# Patient Record
Sex: FEMALE | Race: WHITE | NOT HISPANIC OR LATINO
[De-identification: names, ages, dates, MRNs, and addresses within clinical notes are randomized per-mention and may not be internally consistent; named-entity substitution may affect disease eponyms.]

---

## 2018-10-29 PROBLEM — Z00.00 ENCOUNTER FOR PREVENTIVE HEALTH EXAMINATION: Status: ACTIVE | Noted: 2018-10-29

## 2019-01-24 ENCOUNTER — ASOB RESULT (OUTPATIENT)
Age: 19
End: 2019-01-24

## 2019-01-24 ENCOUNTER — APPOINTMENT (OUTPATIENT)
Dept: ANTEPARTUM | Facility: CLINIC | Age: 19
End: 2019-01-24
Payer: MEDICAID

## 2019-01-24 PROCEDURE — 76817 TRANSVAGINAL US OBSTETRIC: CPT

## 2019-01-24 PROCEDURE — 76811 OB US DETAILED SNGL FETUS: CPT

## 2019-06-12 ENCOUNTER — OUTPATIENT (OUTPATIENT)
Dept: OUTPATIENT SERVICES | Facility: HOSPITAL | Age: 19
LOS: 1 days | Discharge: HOME | End: 2019-06-12
Payer: MEDICAID

## 2019-06-12 VITALS
SYSTOLIC BLOOD PRESSURE: 129 MMHG | HEART RATE: 117 BPM | DIASTOLIC BLOOD PRESSURE: 79 MMHG | TEMPERATURE: 100 F | RESPIRATION RATE: 19 BRPM

## 2019-06-12 VITALS — SYSTOLIC BLOOD PRESSURE: 129 MMHG | HEART RATE: 117 BPM | DIASTOLIC BLOOD PRESSURE: 79 MMHG

## 2019-06-12 PROCEDURE — 76815 OB US LIMITED FETUS(S): CPT | Mod: 26

## 2019-06-12 PROCEDURE — 59025 FETAL NON-STRESS TEST: CPT | Mod: 26,59

## 2019-06-12 PROCEDURE — 99213 OFFICE O/P EST LOW 20 MIN: CPT | Mod: 25

## 2019-06-12 NOTE — OB PROVIDER TRIAGE NOTE - NSHPLABSRESULTS_GEN_ALL_CORE
CRISTIANE Easton  [facility] [address] [place] [address]  [phone]               Thank you for choosing us for your health care visit with Nicci Valdovinos MD.  We are glad to serve you and happy to provide you with this summary of your visit.   [place], [address], [address] [facility]  [address]  [address] [place] [address]    Serena Tanner [address]  [address] [place], [place]    It is the patient's responsibility to check Call the zhiwok for assistance with your inactive [employer] account    If you have questions, you can call [phone] to talk to our [facility] Staff. Remember, [employer] is NOT to be used for urgent needs. For medical emergencies, dial 911.     Vi walking, light jogging, cycling, swimming, etc.) for a goal of at least 150 minutes per week. Moderation of alcohol consumption Men: limit to <= 2 drinks* per day. Women and lighter weight persons: limit to <= 1 drink* per day. GBS negative   B positive  HBSAG non reactive   RUbella immune  MMR immune  Mumps immune  RPR non reactive

## 2019-06-12 NOTE — OB PROVIDER TRIAGE NOTE - NSHPPHYSICALEXAM_GEN_ALL_CORE
PHYSICAL EXAM:  T(F): 99.8 (06-12 @ 08:16)  HR: 117 (06-12 @ 08:16)  BP: 129/79 (06-12 @ 08:16)  RR: 19 (06-12 @ 08:16)  Constitutional: AAOx3, NAD  Abdomen: Soft, gravid, nontender, no palpable ctx  '  FHR CAT I   TOCO occasional  vE 2/60/-3 vertex intact membranes by DR Andrade

## 2019-06-12 NOTE — OB PROVIDER TRIAGE NOTE - NSOBPROVIDERNOTE_OBGYN_ALL_OB_FT
40.1 week IUP intact membranes not in labor  d/c to home as per Dr Andrade  FK & labor precautions  f/u pmd as directed

## 2019-06-12 NOTE — OB RN TRIAGE NOTE - NS_FINALEDD_OBGYN_ALL_OB_DT
PRINCIPAL DISCHARGE DIAGNOSIS  Diagnosis: SOB (shortness of breath)  Assessment and Plan of Treatment: Continue current medications   Follow up with doctor in 1 week.      SECONDARY DISCHARGE DIAGNOSES  Diagnosis: Viral upper respiratory illness  Assessment and Plan of Treatment: Continue current medications   Follow up with doctor in 1 week.    Diagnosis: ESRD on dialysis  Assessment and Plan of Treatment: Continue with your dialysis treatments. Follow with your nephrologist (kidney physician). Continue your medications as prescribed. 11-Jun-2019

## 2019-06-12 NOTE — OB PROVIDER TRIAGE NOTE - HISTORY OF PRESENT ILLNESS
19 y/o  40.1 weeks edmund 19 dated by 1st trimester sonogram who presents to l7 D for possible labor. Pt reports + fm no srom no vaginal bleeding, + movement

## 2019-06-17 ENCOUNTER — OUTPATIENT (OUTPATIENT)
Dept: OUTPATIENT SERVICES | Facility: HOSPITAL | Age: 19
LOS: 1 days | Discharge: HOME | End: 2019-06-17

## 2019-06-17 ENCOUNTER — INPATIENT (INPATIENT)
Facility: HOSPITAL | Age: 19
LOS: 2 days | Discharge: HOME | End: 2019-06-20
Attending: OBSTETRICS & GYNECOLOGY | Admitting: OBSTETRICS & GYNECOLOGY
Payer: MEDICAID

## 2019-06-17 VITALS — TEMPERATURE: 99 F | SYSTOLIC BLOOD PRESSURE: 129 MMHG | DIASTOLIC BLOOD PRESSURE: 73 MMHG

## 2019-06-17 VITALS — TEMPERATURE: 99 F

## 2019-06-17 LAB
APPEARANCE UR: CLEAR — SIGNIFICANT CHANGE UP
BASOPHILS # BLD AUTO: 0.03 K/UL — SIGNIFICANT CHANGE UP (ref 0–0.2)
BASOPHILS NFR BLD AUTO: 0.3 % — SIGNIFICANT CHANGE UP (ref 0–1)
BILIRUB UR-MCNC: NEGATIVE — SIGNIFICANT CHANGE UP
COLOR SPEC: YELLOW — SIGNIFICANT CHANGE UP
DIFF PNL FLD: NEGATIVE — SIGNIFICANT CHANGE UP
EOSINOPHIL # BLD AUTO: 0.06 K/UL — SIGNIFICANT CHANGE UP (ref 0–0.7)
EOSINOPHIL NFR BLD AUTO: 0.6 % — SIGNIFICANT CHANGE UP (ref 0–8)
GLUCOSE UR QL: NEGATIVE MG/DL — SIGNIFICANT CHANGE UP
HCT VFR BLD CALC: 35.9 % — LOW (ref 37–47)
HGB BLD-MCNC: 11.8 G/DL — LOW (ref 12–16)
IMM GRANULOCYTES NFR BLD AUTO: 0.5 % — HIGH (ref 0.1–0.3)
KETONES UR-MCNC: NEGATIVE — SIGNIFICANT CHANGE UP
LEUKOCYTE ESTERASE UR-ACNC: NEGATIVE — SIGNIFICANT CHANGE UP
LYMPHOCYTES # BLD AUTO: 2.12 K/UL — SIGNIFICANT CHANGE UP (ref 1.2–3.4)
LYMPHOCYTES # BLD AUTO: 20.3 % — LOW (ref 20.5–51.1)
MCHC RBC-ENTMCNC: 27.6 PG — SIGNIFICANT CHANGE UP (ref 27–31)
MCHC RBC-ENTMCNC: 32.9 G/DL — SIGNIFICANT CHANGE UP (ref 32–37)
MCV RBC AUTO: 83.9 FL — SIGNIFICANT CHANGE UP (ref 81–99)
MONOCYTES # BLD AUTO: 0.97 K/UL — HIGH (ref 0.1–0.6)
MONOCYTES NFR BLD AUTO: 9.3 % — SIGNIFICANT CHANGE UP (ref 1.7–9.3)
NEUTROPHILS # BLD AUTO: 7.19 K/UL — HIGH (ref 1.4–6.5)
NEUTROPHILS NFR BLD AUTO: 69 % — SIGNIFICANT CHANGE UP (ref 42.2–75.2)
NITRITE UR-MCNC: NEGATIVE — SIGNIFICANT CHANGE UP
NRBC # BLD: 0 /100 WBCS — SIGNIFICANT CHANGE UP (ref 0–0)
PH UR: 7.5 — SIGNIFICANT CHANGE UP (ref 5–8)
PLATELET # BLD AUTO: 246 K/UL — SIGNIFICANT CHANGE UP (ref 130–400)
PRENATAL SYPHILIS TEST: SIGNIFICANT CHANGE UP
PROT UR-MCNC: NEGATIVE MG/DL — SIGNIFICANT CHANGE UP
RBC # BLD: 4.28 M/UL — SIGNIFICANT CHANGE UP (ref 4.2–5.4)
RBC # FLD: 14.3 % — SIGNIFICANT CHANGE UP (ref 11.5–14.5)
SP GR SPEC: 1.01 — SIGNIFICANT CHANGE UP (ref 1.01–1.03)
UROBILINOGEN FLD QL: 0.2 MG/DL — SIGNIFICANT CHANGE UP (ref 0.2–0.2)
WBC # BLD: 10.42 K/UL — SIGNIFICANT CHANGE UP (ref 4.8–10.8)
WBC # FLD AUTO: 10.42 K/UL — SIGNIFICANT CHANGE UP (ref 4.8–10.8)

## 2019-06-17 PROCEDURE — 76815 OB US LIMITED FETUS(S): CPT | Mod: 26

## 2019-06-17 PROCEDURE — 59025 FETAL NON-STRESS TEST: CPT | Mod: 26,59

## 2019-06-17 PROCEDURE — 99213 OFFICE O/P EST LOW 20 MIN: CPT | Mod: 25

## 2019-06-17 RX ORDER — CITRIC ACID/SODIUM CITRATE 300-500 MG
15 SOLUTION, ORAL ORAL EVERY 6 HOURS
Refills: 0 | Status: DISCONTINUED | OUTPATIENT
Start: 2019-06-17 | End: 2019-06-18

## 2019-06-17 RX ORDER — OXYTOCIN 10 UNIT/ML
333.33 VIAL (ML) INJECTION
Qty: 20 | Refills: 0 | Status: DISCONTINUED | OUTPATIENT
Start: 2019-06-17 | End: 2019-06-20

## 2019-06-17 RX ORDER — SODIUM CHLORIDE 9 MG/ML
1000 INJECTION, SOLUTION INTRAVENOUS
Refills: 0 | Status: DISCONTINUED | OUTPATIENT
Start: 2019-06-17 | End: 2019-06-18

## 2019-06-17 RX ORDER — SODIUM CHLORIDE 9 MG/ML
1000 INJECTION, SOLUTION INTRAVENOUS
Refills: 0 | Status: DISCONTINUED | OUTPATIENT
Start: 2019-06-17 | End: 2019-06-17

## 2019-06-17 RX ORDER — ONDANSETRON 8 MG/1
4 TABLET, FILM COATED ORAL EVERY 6 HOURS
Refills: 0 | Status: DISCONTINUED | OUTPATIENT
Start: 2019-06-17 | End: 2019-06-20

## 2019-06-17 RX ORDER — OXYTOCIN 10 UNIT/ML
333.33 VIAL (ML) INJECTION
Qty: 20 | Refills: 0 | Status: DISCONTINUED | OUTPATIENT
Start: 2019-06-17 | End: 2019-06-17

## 2019-06-17 RX ORDER — NALOXONE HYDROCHLORIDE 4 MG/.1ML
0.1 SPRAY NASAL
Refills: 0 | Status: DISCONTINUED | OUTPATIENT
Start: 2019-06-17 | End: 2019-06-20

## 2019-06-17 RX ORDER — OXYTOCIN 10 UNIT/ML
2 VIAL (ML) INJECTION
Qty: 30 | Refills: 0 | Status: DISCONTINUED | OUTPATIENT
Start: 2019-06-17 | End: 2019-06-18

## 2019-06-17 RX ORDER — FENTANYL/BUPIVACAINE/NS/PF 2MCG/ML-.1
250 PLASTIC BAG, INJECTION (ML) INJECTION
Refills: 0 | Status: DISCONTINUED | OUTPATIENT
Start: 2019-06-17 | End: 2019-06-20

## 2019-06-17 RX ADMIN — Medication 2 MILLIUNIT(S)/MIN: at 22:15

## 2019-06-17 NOTE — OB RN TRIAGE NOTE - CHIEF COMPLAINT QUOTE
Contractions every 5 minutes since 3am Labor check/ postdate, contractions every 5 minutes since 3am

## 2019-06-17 NOTE — OB RN PATIENT PROFILE - NSCAFFEINETYPE_GEN_ALL_CORE_SD
· Orthotics were fit into patient's shoes after being trimmed.   · Patient trial of orthotics with walking, standing.  Patient reports orthotics feel comfortable.  · Patient was instructed in a donning regimen to allow a comfortable transition to full-time use of orthotics.  · Patient was instructed on care of their orthotics including weaning, care, cleaning, fitting, and skin care.  · Orthotics fit for tennis shoes.     ASSESSMENT   Patient does  demonstrate awareness of appropriate weaning process into their new orthotics as well as monitoring skin care.  Understands patient is to call with questions/concerns regarding fitting.    Patient has been instructed to contact our clinic with any complications arising from their use or lack of improvement in symptoms.  Patient Verbalizes understanding of education as documented/outlined above.     [facility] labs cast number [phone]  
coffee

## 2019-06-17 NOTE — OB PROVIDER TRIAGE NOTE - NSOBPROVIDERNOTE_OBGYN_ALL_OB_FT
18y.o.  @ 40.6wks in ealry labor, GBS negative, Measles immune  - Pt to ambulate x 2 hrs and return to L&D fo re-evaluation  - Labor precautions reviewed  - D/W Dr. dodge

## 2019-06-17 NOTE — PROCEDURE NOTE - ADDITIONAL PROCEDURE DETAILS
Post Epidural, pt c/o of "dizziness".  Administered:  Phenylephrine 50/50 = 100 mcg IVP + Ephedrine 5mg IVP x 1

## 2019-06-17 NOTE — OB PROVIDER TRIAGE NOTE - NSHPPHYSICALEXAM_GEN_ALL_CORE
T(C): 37.4 (06-17-19 @ 05:49), Max: 37.4 (06-17-19 @ 05:49)  HR: 102 (06-17-19 @ 05:54) (102 - 102)  BP: 114/77 (06-17-19 @ 05:54) (114/77 - 114/77)  RR: 16 (06-17-19 @ 05:49) (16 - 16)    VE: 3/90/-1  Ctx: q 5-7 min  FHR: 130 moderate variability, +accels, Cat I

## 2019-06-17 NOTE — PROGRESS NOTE ADULT - ASSESSMENT
18y  at 40w6d, GBS negative, undergoing induction with pitocin, s/p AROM, clear at 2240, s/p epidural      -Cont efm and toco  -cont with pitocin  -IV hydration and clear liquid diet  -Pain management PRN    Dr. Ness and Dr. Hdz aware

## 2019-06-17 NOTE — OB PROVIDER TRIAGE NOTE - ADDITIONAL INSTRUCTIONS
If you feel regular, painful contraction, Leaking of fluid, heavy vaginal bleeding or decreased fetal movement call your doctor or return to hospital  Increase your fluid intake  Followup tomorrow with your doctor

## 2019-06-17 NOTE — OB PROVIDER TRIAGE NOTE - PLAN OF CARE
Healthy mom/ Healthy baby Discharge to home  PO hydration  Labor precuations/ FKC reviewed  Followup with PMD tomorrow  Dr. Caputo/ Dr. Hdz aware

## 2019-06-17 NOTE — OB PROVIDER TRIAGE NOTE - NSHPLABSRESULTS_GEN_ALL_CORE
10/17/2018    Type and screen B positive, Ab negative  RPR nonreactive  HBSag nonreactive  HIV  Rubella immune  Varicella immune  measles immune    Gonorrhea negative  Chlamydia negative        3rd trimester- 5/13/2019  RPR nonreactive  GBS 5/13/2019 negative  HIV nonreactive    Sono @ 20wks S=D, ant placenta, nl anatomy

## 2019-06-17 NOTE — OB PROVIDER TRIAGE NOTE - HISTORY OF PRESENT ILLNESS
18y.o.  @ 40.6wks EDC: 2019 by 1st trimester sono presents c/o ctx since 0300am, q 5  min. Pt denies LOF, VB and states good FM Pt was examined in office 3 days ago and was 2cm.

## 2019-06-17 NOTE — OB RN TRIAGE NOTE - NS_TRIAGEADDITIONAL COMMENTS_OBGYN_ALL_OB_FT
patient sent walking as per ANTONIO Landin, instructed on when to return to L&D, verbalized understanding

## 2019-06-17 NOTE — OB PROVIDER H&P - ASSESSMENT
A/P: 18y.o.  @ 40.6wks r/o labor, BPP , for admission and IOL  -admit to labor and delivery  -pain management prn  -cont efm/toco  -admission labs  -iv hydration  -clear liquid diet  -pitocin for IOL

## 2019-06-17 NOTE — OB PROVIDER H&P - NSHPPHYSICALEXAM_GEN_ALL_CORE
T(C): 37.4 (06-17-19 @ 05:49), Max: 37.4 (06-17-19 @ 05:49)  HR: 102 (06-17-19 @ 05:54) (102 - 102)  BP: 114/77 (06-17-19 @ 05:54) (114/77 - 114/77)  RR: 16 (06-17-19 @ 05:49) (16 - 16)    VE: 5/90/0 @1830  Ctx: q 5-7 min  FHR: 130 moderate variability, +accels, Cat I

## 2019-06-17 NOTE — PROCEDURE NOTE - SUPERVISORY STATEMENT
Epidural infusion:  0.1% Bupivacaine with Fentanyl 2 mcg/ml (premixed)  Rate:  15 ml/ml  Sensory:  T8 (Rt = Mid = Lt)  Motor intact, able to flex b/l knees w/o any difficulty

## 2019-06-18 DIAGNOSIS — Z02.9 ENCOUNTER FOR ADMINISTRATIVE EXAMINATIONS, UNSPECIFIED: ICD-10-CM

## 2019-06-18 LAB
AMPHET UR-MCNC: NEGATIVE — SIGNIFICANT CHANGE UP
ANISOCYTOSIS BLD QL: SLIGHT — SIGNIFICANT CHANGE UP
BARBITURATES UR SCN-MCNC: NEGATIVE — SIGNIFICANT CHANGE UP
BASOPHILS # BLD AUTO: 0 K/UL — SIGNIFICANT CHANGE UP (ref 0–0.2)
BASOPHILS NFR BLD AUTO: 0 % — SIGNIFICANT CHANGE UP (ref 0–1)
BENZODIAZ UR-MCNC: NEGATIVE — SIGNIFICANT CHANGE UP
BLD GP AB SCN SERPL QL: SIGNIFICANT CHANGE UP
BUPRENORPHINE SCREEN, URINE RESULT: NEGATIVE — SIGNIFICANT CHANGE UP
COCAINE METAB.OTHER UR-MCNC: NEGATIVE — SIGNIFICANT CHANGE UP
EOSINOPHIL # BLD AUTO: 0.25 K/UL — SIGNIFICANT CHANGE UP (ref 0–0.7)
EOSINOPHIL NFR BLD AUTO: 1.8 % — SIGNIFICANT CHANGE UP (ref 0–8)
GIANT PLATELETS BLD QL SMEAR: PRESENT — SIGNIFICANT CHANGE UP
HCT VFR BLD CALC: 29.6 % — LOW (ref 37–47)
HGB BLD-MCNC: 9.6 G/DL — LOW (ref 12–16)
HYPOCHROMIA BLD QL: SLIGHT — SIGNIFICANT CHANGE UP
L&D DRUG SCREEN, URINE: SIGNIFICANT CHANGE UP
LYMPHOCYTES # BLD AUTO: 17.3 % — LOW (ref 20.5–51.1)
LYMPHOCYTES # BLD AUTO: 2.44 K/UL — SIGNIFICANT CHANGE UP (ref 1.2–3.4)
MANUAL SMEAR VERIFICATION: SIGNIFICANT CHANGE UP
MCHC RBC-ENTMCNC: 27.7 PG — SIGNIFICANT CHANGE UP (ref 27–31)
MCHC RBC-ENTMCNC: 32.4 G/DL — SIGNIFICANT CHANGE UP (ref 32–37)
MCV RBC AUTO: 85.5 FL — SIGNIFICANT CHANGE UP (ref 81–99)
METHADONE UR-MCNC: NEGATIVE — SIGNIFICANT CHANGE UP
MICROCYTES BLD QL: SLIGHT — SIGNIFICANT CHANGE UP
MONOCYTES # BLD AUTO: 1.28 K/UL — HIGH (ref 0.1–0.6)
MONOCYTES NFR BLD AUTO: 9.1 % — SIGNIFICANT CHANGE UP (ref 1.7–9.3)
MYELOCYTES NFR BLD: 0.9 % — HIGH (ref 0–0)
NEUTROPHILS # BLD AUTO: 10.01 K/UL — HIGH (ref 1.4–6.5)
NEUTROPHILS NFR BLD AUTO: 70.9 % — SIGNIFICANT CHANGE UP (ref 42.2–75.2)
NEUTS HYPERSEG # BLD: PRESENT — SIGNIFICANT CHANGE UP
OPIATES UR-MCNC: NEGATIVE — SIGNIFICANT CHANGE UP
OXYCODONE UR-MCNC: NEGATIVE — SIGNIFICANT CHANGE UP
PCP UR-MCNC: NEGATIVE — SIGNIFICANT CHANGE UP
PLAT MORPH BLD: NORMAL — SIGNIFICANT CHANGE UP
PLATELET # BLD AUTO: 206 K/UL — SIGNIFICANT CHANGE UP (ref 130–400)
POIKILOCYTOSIS BLD QL AUTO: SLIGHT — SIGNIFICANT CHANGE UP
PROPOXYPHENE QUALITATIVE URINE RESULT: NEGATIVE — SIGNIFICANT CHANGE UP
RBC # BLD: 3.46 M/UL — LOW (ref 4.2–5.4)
RBC # FLD: 14.7 % — HIGH (ref 11.5–14.5)
RBC BLD AUTO: ABNORMAL
WBC # BLD: 14.12 K/UL — HIGH (ref 4.8–10.8)
WBC # FLD AUTO: 14.12 K/UL — HIGH (ref 4.8–10.8)

## 2019-06-18 PROCEDURE — 59400 OBSTETRICAL CARE: CPT | Mod: U9

## 2019-06-18 RX ORDER — SIMETHICONE 80 MG/1
80 TABLET, CHEWABLE ORAL EVERY 4 HOURS
Refills: 0 | Status: DISCONTINUED | OUTPATIENT
Start: 2019-06-18 | End: 2019-06-20

## 2019-06-18 RX ORDER — IBUPROFEN 200 MG
600 TABLET ORAL EVERY 6 HOURS
Refills: 0 | Status: DISCONTINUED | OUTPATIENT
Start: 2019-06-18 | End: 2019-06-20

## 2019-06-18 RX ORDER — IBUPROFEN 200 MG
600 TABLET ORAL EVERY 6 HOURS
Refills: 0 | Status: COMPLETED | OUTPATIENT
Start: 2019-06-18 | End: 2020-05-16

## 2019-06-18 RX ORDER — KETOROLAC TROMETHAMINE 30 MG/ML
30 SYRINGE (ML) INJECTION ONCE
Refills: 0 | Status: DISCONTINUED | OUTPATIENT
Start: 2019-06-18 | End: 2019-06-18

## 2019-06-18 RX ORDER — MAGNESIUM HYDROXIDE 400 MG/1
30 TABLET, CHEWABLE ORAL
Refills: 0 | Status: DISCONTINUED | OUTPATIENT
Start: 2019-06-18 | End: 2019-06-20

## 2019-06-18 RX ORDER — OXYCODONE HYDROCHLORIDE 5 MG/1
5 TABLET ORAL
Refills: 0 | Status: DISCONTINUED | OUTPATIENT
Start: 2019-06-18 | End: 2019-06-20

## 2019-06-18 RX ORDER — ACETAMINOPHEN 500 MG
975 TABLET ORAL EVERY 6 HOURS
Refills: 0 | Status: DISCONTINUED | OUTPATIENT
Start: 2019-06-18 | End: 2019-06-20

## 2019-06-18 RX ORDER — GLYCERIN ADULT
1 SUPPOSITORY, RECTAL RECTAL AT BEDTIME
Refills: 0 | Status: DISCONTINUED | OUTPATIENT
Start: 2019-06-18 | End: 2019-06-20

## 2019-06-18 RX ORDER — DIPHENHYDRAMINE HCL 50 MG
25 CAPSULE ORAL EVERY 6 HOURS
Refills: 0 | Status: DISCONTINUED | OUTPATIENT
Start: 2019-06-18 | End: 2019-06-20

## 2019-06-18 RX ORDER — DOCUSATE SODIUM 100 MG
100 CAPSULE ORAL
Refills: 0 | Status: DISCONTINUED | OUTPATIENT
Start: 2019-06-18 | End: 2019-06-20

## 2019-06-18 RX ORDER — AER TRAVELER 0.5 G/1
1 SOLUTION RECTAL; TOPICAL EVERY 4 HOURS
Refills: 0 | Status: DISCONTINUED | OUTPATIENT
Start: 2019-06-18 | End: 2019-06-20

## 2019-06-18 RX ORDER — SODIUM CHLORIDE 9 MG/ML
3 INJECTION INTRAMUSCULAR; INTRAVENOUS; SUBCUTANEOUS EVERY 8 HOURS
Refills: 0 | Status: DISCONTINUED | OUTPATIENT
Start: 2019-06-18 | End: 2019-06-19

## 2019-06-18 RX ORDER — BENZOCAINE 10 %
1 GEL (GRAM) MUCOUS MEMBRANE EVERY 6 HOURS
Refills: 0 | Status: DISCONTINUED | OUTPATIENT
Start: 2019-06-18 | End: 2019-06-20

## 2019-06-18 RX ORDER — PRAMOXINE HYDROCHLORIDE 150 MG/15G
1 AEROSOL, FOAM RECTAL EVERY 4 HOURS
Refills: 0 | Status: DISCONTINUED | OUTPATIENT
Start: 2019-06-18 | End: 2019-06-20

## 2019-06-18 RX ORDER — HYDROCORTISONE 1 %
1 OINTMENT (GRAM) TOPICAL EVERY 6 HOURS
Refills: 0 | Status: DISCONTINUED | OUTPATIENT
Start: 2019-06-18 | End: 2019-06-20

## 2019-06-18 RX ORDER — DIBUCAINE 1 %
1 OINTMENT (GRAM) RECTAL EVERY 6 HOURS
Refills: 0 | Status: DISCONTINUED | OUTPATIENT
Start: 2019-06-18 | End: 2019-06-20

## 2019-06-18 RX ORDER — LANOLIN
1 OINTMENT (GRAM) TOPICAL EVERY 6 HOURS
Refills: 0 | Status: DISCONTINUED | OUTPATIENT
Start: 2019-06-18 | End: 2019-06-20

## 2019-06-18 RX ORDER — OXYCODONE HYDROCHLORIDE 5 MG/1
5 TABLET ORAL ONCE
Refills: 0 | Status: DISCONTINUED | OUTPATIENT
Start: 2019-06-18 | End: 2019-06-20

## 2019-06-18 RX ADMIN — Medication 600 MILLIGRAM(S): at 12:24

## 2019-06-18 RX ADMIN — Medication 30 MILLIGRAM(S): at 07:00

## 2019-06-18 RX ADMIN — Medication 600 MILLIGRAM(S): at 17:09

## 2019-06-18 RX ADMIN — Medication 975 MILLIGRAM(S): at 20:57

## 2019-06-18 NOTE — OB PROVIDER DELIVERY SUMMARY - NSPROVIDERDELIVERYNOTE_OBGYN_ALL_OB_FT
Patient was fully dilated and pushing. Fetal head was OA and restituted to LOT. The anterior and posterior shoulders delivered, followed by the remaining body atraumatically. Delayed cord clamping was performed, and then clamped and cut. Cord blood gases collected x2. The  was handed to the mother. The placenta delivered intact with membranes. Pitocin was administered. Uterus massaged, fundus found to be firm. Cervix, vagina and perineum inspected. Second laceration was noted, repaired using 2-0 chromic in the usual fashion with good hemostasis.     Viable female infant delivered, with APGARs 9/9    Laceration: 2nd degree laceration   EBL 300cc

## 2019-06-18 NOTE — PROGRESS NOTE ADULT - ASSESSMENT
18y  at 40w6d, GBS negative, undergoing induction with pitocin, s/p AROM, clear at 2240, s/p epidural      -Cont efm and toco  -cont with pitocin  -IV hydration and clear liquid diet  -Pain management PRN    Dr. Nses and Dr. Hdz aware

## 2019-06-19 RX ADMIN — Medication 600 MILLIGRAM(S): at 06:25

## 2019-06-19 RX ADMIN — Medication 975 MILLIGRAM(S): at 15:50

## 2019-06-19 RX ADMIN — SODIUM CHLORIDE 3 MILLILITER(S): 9 INJECTION INTRAMUSCULAR; INTRAVENOUS; SUBCUTANEOUS at 06:20

## 2019-06-19 RX ADMIN — Medication 600 MILLIGRAM(S): at 23:09

## 2019-06-19 RX ADMIN — Medication 600 MILLIGRAM(S): at 12:22

## 2019-06-19 RX ADMIN — Medication 600 MILLIGRAM(S): at 18:30

## 2019-06-19 RX ADMIN — Medication 975 MILLIGRAM(S): at 20:51

## 2019-06-19 RX ADMIN — Medication 975 MILLIGRAM(S): at 21:18

## 2019-06-19 RX ADMIN — Medication 975 MILLIGRAM(S): at 09:30

## 2019-06-20 ENCOUNTER — TRANSCRIPTION ENCOUNTER (OUTPATIENT)
Age: 19
End: 2019-06-20

## 2019-06-20 VITALS
TEMPERATURE: 98 F | DIASTOLIC BLOOD PRESSURE: 66 MMHG | SYSTOLIC BLOOD PRESSURE: 122 MMHG | HEART RATE: 100 BPM | RESPIRATION RATE: 18 BRPM

## 2019-06-20 PROCEDURE — 93970 EXTREMITY STUDY: CPT | Mod: 26

## 2019-06-20 RX ORDER — ACETAMINOPHEN 500 MG
3 TABLET ORAL
Qty: 0 | Refills: 0 | DISCHARGE
Start: 2019-06-20

## 2019-06-20 RX ORDER — DIBUCAINE 1 %
1 OINTMENT (GRAM) RECTAL
Qty: 0 | Refills: 0 | DISCHARGE
Start: 2019-06-20

## 2019-06-20 RX ORDER — LANOLIN
1 OINTMENT (GRAM) TOPICAL
Qty: 0 | Refills: 0 | DISCHARGE
Start: 2019-06-20

## 2019-06-20 RX ORDER — HYDROCORTISONE 1 %
1 OINTMENT (GRAM) TOPICAL
Qty: 0 | Refills: 0 | DISCHARGE
Start: 2019-06-20

## 2019-06-20 RX ORDER — AER TRAVELER 0.5 G/1
1 SOLUTION RECTAL; TOPICAL
Qty: 0 | Refills: 0 | DISCHARGE
Start: 2019-06-20

## 2019-06-20 RX ORDER — BENZOCAINE 10 %
1 GEL (GRAM) MUCOUS MEMBRANE
Qty: 0 | Refills: 0 | DISCHARGE
Start: 2019-06-20

## 2019-06-20 RX ADMIN — Medication 600 MILLIGRAM(S): at 05:59

## 2019-06-20 RX ADMIN — Medication 975 MILLIGRAM(S): at 09:41

## 2019-06-20 RX ADMIN — Medication 975 MILLIGRAM(S): at 10:10

## 2019-06-20 NOTE — DISCHARGE NOTE OB - PLAN OF CARE
healthy mom & baby A/P:  18y Female s/p  PPD2, doing well, no issues.  -Encourage PO hydration and ambulation  -Pain management PRN  -d/c to home

## 2019-06-20 NOTE — DISCHARGE NOTE OB - PATIENT PORTAL LINK FT
You can access the [facility] Patient Portal, offered by [facility], by registering with the following website: [email or web address]

## 2019-06-20 NOTE — DISCHARGE NOTE OB - HOSPITAL COURSE
Patient admitted in labor, underwent uncomplicated vaginal delivery and had an uncomplicated postpartum course, discharged on PPD 2.

## 2019-06-20 NOTE — PROGRESS NOTE ADULT - SUBJECTIVE AND OBJECTIVE BOX
OB attending  PPD #2    Pt doing well, pain well controlled. Patient c/o pain in R calf     Ambulating: Yes  Voiding: Yes  Flatus: Yes  Bowel movements: Yes   Breast or bottle feeding: Breastfeeding  Diet: Regular    PAST MEDICAL & SURGICAL HISTORY:  No pertinent past medical history  No significant past surgical history      Physical Exam  Vital Signs Last 24 Hrs  T(C): 36.6 (2019 07:38), Max: 36.6 (2019 07:38)  T(F): 97.9 (2019 07:38), Max: 97.9 (2019 07:38)  HR: 100 (2019 07:38) (72 - 100)  BP: 122/66 (2019 07:38) (100/58 - 132/62)  BP(mean): --  RR: 18 (2019 07:38) (18 - 18)  SpO2: --  Gen: AAOx3, NAD  Abd: Soft, nontender, nondistended, BS+  Fundus: Firm, below umbilicus  Lochia: normal  Ext: mild tenderness Rcalf, no swelling, No homans , Left is normal     Labs:                        9.6    14.12 )-----------( 206      ( 2019 20:00 )             29.6     rh+    A/P: s/p , PPD #2, doing well  - continue current management  -Lower ext doppler ordered low suspicion   -if neg can be d/c home
OB attending  PPD #1    Pt doing well, pain well controlled. No overnight events, no acute complaints.    Ambulating: Yes  Voiding: Yes  Flatus: Yes  Bowel movements: Yes   Breast or bottle feeding: Breastfeeding  Diet: Regular    PAST MEDICAL & SURGICAL HISTORY:  No pertinent past medical history  No significant past surgical history      Physical Exam  Vital Signs Last 24 Hrs  T(C): 35.9 (2019 15:30), Max: 36.5 (2019 07:40)  T(F): 96.6 (2019 15:30), Max: 97.7 (2019 07:40)  HR: 72 (2019 15:30) (72 - 98)  BP: 132/62 (2019 15:30) (94/57 - 132/62)  BP(mean): --  RR: 18 (2019 15:30) (16 - 18)  SpO2: --  Gen: AAOx3, NAD  Abd: Soft, nontender, nondistended, BS+  Fundus: Firm, below umbilicus  Lochia: normal  Ext: No calf tenderness, no swelling    Labs:                        9.6    14.12 )-----------( 206      ( 2019 20:00 )             29.6         A/P: s/p , PPD #1, doing well  - continue current management
PGY2 Note    Patient seen at bedside for evaluation, feels intermittent pressure. Pain is well controlled.    T(F): 98.2 ( @ 18:19), Max: 99.3 ( @ 05:49)  HR: 70 ( @ 01:05)  BP: 97/51 ( @ 01:05) (79/44 - 132/84)  RR: 16 ( 18:19)  EFM: 140/mod/+Accels  TOCO: q 2-3min  SVE: 6100/1    Medications:  oxytocin Infusion: 2 ( @ 18:32) at 8mU/min      Labs:                        11.8   10.42 )-----------( 246      ( 2019 18:32 )             35.9           Prenatal Syphilis Test: Nonreact ( @ 18:32)  Antibody Screen: NEG (19 @ 23:00)    Urinalysis Basic - ( 2019 23:00 )    Color: Yellow / Appearance: Clear / S.010 / pH: x  Gluc: x / Ketone: Negative  / Bili: Negative / Urobili: 0.2 mg/dL   Blood: x / Protein: Negative mg/dL / Nitrite: Negative   Leuk Esterase: Negative / RBC: x / WBC x   Sq Epi: x / Non Sq Epi: x / Bacteria: x
PGY2 Note    Patient seen at bedside for evaluation, resting comfortably, no complaints.    T(F): 98.2 (06-17 @ 18:19), Max: 99.3 (06-17 @ 05:49)  HR: 81 (06-17 @ 22:35)  BP: 109/59 (06-17 @ 22:35) (79/44 - 132/84)  RR: 16 (06-17 @ 18:19)  EFM: 150/mod/+accels  TOCO: q 3-4min  SVE: 5/90/-2 AROM, clear    Medications:  oxytocin Infusion: 2 (06-17 @ 18:32)- increased to 4mU/Min      Labs:                        11.8   10.42 )-----------( 246      ( 17 Jun 2019 18:32 )             35.9         Prenatal Syphilis Test: Nonreact (06-17 @ 18:32)  UDS, UA, T&S pending collection

## 2019-06-20 NOTE — DISCHARGE NOTE OB - CARE PLAN
Principal Discharge DX:	Vaginal delivery  Goal:	healthy mom & baby  Assessment and plan of treatment:	A/P:  18y Female s/p  PPD2, doing well, no issues.  -Encourage PO hydration and ambulation  -Pain management PRN  -d/c to home

## 2019-06-20 NOTE — DISCHARGE NOTE OB - MEDICATION SUMMARY - MEDICATIONS TO TAKE
I will START or STAY ON the medications listed below when I get home from the hospital:    acetaminophen 325 mg oral tablet  -- 3 tab(s) by mouth every 6 hours  -- Indication: For pain    benzocaine 20% topical spray  -- 1 spray(s) on skin every 6 hours, As needed, for Perineal discomfort  -- Indication: For pain    hydrocortisone 1% topical cream  -- 1 application on skin every 6 hours, As needed, Moderate Pain (4-6)  -- Indication: For pain    dibucaine 1% topical ointment  -- 1 application on skin every 6 hours, As needed, Perineal discomfort  -- Indication: For pain    lanolin topical ointment  -- 1 application on skin every 6 hours, As needed, nipple soreness  -- Indication: For cracked nipples    witch hazel 50% topical pad  -- 1 application on skin every 4 hours, As needed, Perineal discomfort  -- Indication: For perineal discomfort

## 2019-06-20 NOTE — DISCHARGE NOTE OB - CARE PROVIDER_API CALL
Colin Knutson)  Obstetrics and Gynecology  [address] [place] [address]  Phone: [phone]  Fax: [phone]  Follow Up Time:

## 2019-06-25 DIAGNOSIS — Z3A.40 40 WEEKS GESTATION OF PREGNANCY: ICD-10-CM

## 2019-06-25 DIAGNOSIS — Z34.03 ENCOUNTER FOR SUPERVISION OF NORMAL FIRST PREGNANCY, THIRD TRIMESTER: ICD-10-CM

## 2019-12-18 ENCOUNTER — TRANSCRIPTION ENCOUNTER (OUTPATIENT)
Age: 19
End: 2019-12-18

## 2021-02-06 NOTE — OB PROVIDER H&P - NSINFECTIONS_OBGYN_ALL_OB
Colitis Preventive measure Colitis Colitis Colitis Colitis Preventive measure Colitis Preventive measure Colitis Colitis Preventive measure Preventive measure Preventive measure None

## 2021-02-17 NOTE — OB RN PATIENT PROFILE - AS SC BRADEN MOISTURE
Peripheral IV  Performed by: Ryan Lopez  Authorized by: Ryan Lopez     Size:  22 G  Laterality:  Right  Location:  Hand  Site Prep:  AlcoholTechnique:  Anatomical landmarks  Attempts:  1  Securement: Transparent dressing  Performed By:  Anesthesiologist  Anesthesiologist:  Merly Sweeney MD         (4) rarely moist

## 2025-01-16 NOTE — OB PROVIDER TRIAGE NOTE - NS_FETALHEARTHEAR_OBGYN_ALL_OB
Patient Education       Well Child Exam 2 Years   About this topic   Your child's 4-year well child exam is a visit with the doctor to check your child's health. The doctor measures your child's weight, height, and head size. The doctor plots these numbers on a growth curve. The growth curve gives a picture of your child's growth at each visit. The doctor may listen to your child's heart, lungs, and belly. Your doctor will do a full exam of your child from the head to the toes. The doctor may check your child's hearing and vision.  Your child may also need shots or blood tests during this visit.  General   Growth and Development   Your doctor will ask you how your child is developing. The doctor will focus on the skills that most children your child's age are expected to do. During this time of your child's life, here are some things you can expect.  Movement - Your child may:  Be able to skip  Hop and stand on one foot  Use scissors  Draw circles, squares, and some letters  Get dressed without help  Catch a ball some of the time  Hearing, seeing, and talking - Your child will likely:  Be able to tell a simple story  Speak clearly so others can understand  Speak in longer sentence  Understand concepts of counting, same and different, and time  Learn letters and numbers  Know their full name  Feelings and behavior - Your child will likely:  Enjoy playing mom or dad  Have problems telling the difference between what is and is not real  Be more independent  Have a good imagination  Work together with others  Test rules. Help your child learn what the rules are by having rules that do not change. Make your rules the same all the time. Use a short time out to discipline your child.  Feeding - Your child:  Can start to drink lowfat or fat-free milk. Limit your child to 2 to 3 cups (480 to 720 mL) of milk each day.  Will be eating 3 meals and 1 to 2 snacks a day. Make sure to give your child the right size portions and  healthy choices.  Should be given a variety of healthy foods. Let your child decide how much to eat.  Should have no more than 4 to 6 ounces (120 to 180 mL) of fruit juice a day. Do not give your child soda.  May be able to start brushing teeth. You will still need to help as well. Start using a pea-sized amount of toothpaste with fluoride. Brush your child's teeth 2 to 3 times each day.  Sleep - Your child:  Is likely sleeping about 8 to 10 hours in a row at night. Your child may still take one nap during the day. If your child does not nap, it is good to have some quiet time each day.  May have bad dreams or wake up at night. Try to have the same routine before bedtime.  Potty training - Your child is often potty trained by age 4. It is still normal for accidents to happen when your child is busy. Remind your child to take potty breaks often. It is also normal if your child still has night-time accidents. Encourage your child by:  Using lots of praise and stickers or a chart as rewards when your child is able to go on the potty without being reminded  Dressing your child in clothes that are easy to pull up and down  Understanding that accidents will happen. Do not punish or scold your child if an accident happens.  Shots - It is important for your child to get shots on time. This protects your child from very serious illnesses like brain or lung infections.  Your child may need some shots if they were missed earlier.  Your child can get their last set of shots before they start school. This may include:  DTaP or diphtheria, tetanus, and pertussis vaccine  MMR vaccine or measles, mumps, and rubella  IPV or polio vaccine  Varicella or chickenpox vaccine  Flu or influenza vaccine  Your child may get some of these combined into one shot. This lowers the number of shots your child may get and yet keeps them protected.  Help for Parents   Play with your child.  Go outside as often as you can. Visit playgrounds. Give  your child a tricycle or bicycle to ride. Make sure your child wears a helmet when using anything with wheels like skates, skateboard, bike, etc.  Ask your child to talk about the day. Talk about plans for the next day.  Make a game out of household chores. Sort clothes by color or size. Race to  toys.  Read to your child. Have your child tell the story back to you. Find word that rhyme or start with the same letter.  Give your child paper, safe scissors, glue, and other craft supplies. Help your child make a project.  Here are some things you can do to help keep your child safe and healthy.  Schedule a dentist appointment for your child.  Put sunscreen with a SPF30 or higher on your child at least 15 to 30 minutes before going outside. Put more sunscreen on after about 2 hours.  Do not allow anyone to smoke in your home or around your child.  Have the right size car seat for your child and use it every time your child is in the car. Seats with a harness are safer than just a booster seat with a belt.  Take extra care around water. Make sure your child cannot get to pools or spas. Consider teaching your child to swim.  Never leave your child alone. Do not leave your child in the car or at home alone, even for a few minutes.  Protect your child from gun injuries. If you have a gun, use a trigger lock. Keep the gun locked up and the bullets kept in a separate place.  Limit screen time for children to 1 hour per day. This means TV, phones, computers, tablets, or video games.  Parents need to think about:  Enrolling your child in  or having time for your child to play with other children the same age  How to encourage your child to be physically active  Talking to your child about strangers, unwanted touch, and keeping private parts safe  The next well child visit will most likely be when your child is 3 years old. At this visit your doctor may:  Do a full check up on your child  Talk about limiting  screen time for your child, how well your child is eating, and how to promote physical activity  Talk about discipline and how to correct your child  Getting your child ready for school  When do I need to call the doctor?   Fever of 100.4°F (38°C) or higher  Is not potty trained  Has trouble with constipation  Does not respond to others  You are worried about your child's development  Where can I learn more?   Centers for Disease Control and Prevention  http://www.cdc.gov/vaccines/parents/downloads/milestones-tracker.pdf   Centers for Disease Control and Prevention  https://www.cdc.gov/ncbddd/actearly/milestones/milestones-4yr.html   Kids Health  https://kidshealth.org/en/parents/checkup-4yrs.html?ref=search   Last Reviewed Date   2019-09-12  Consumer Information Use and Disclaimer   This information is not specific medical advice and does not replace information you receive from your health care provider. This is only a brief summary of general information. It does NOT include all information about conditions, illnesses, injuries, tests, procedures, treatments, therapies, discharge instructions or life-style choices that may apply to you. You must talk with your health care provider for complete information about your health and treatment options. This information should not be used to decide whether or not to accept your health care providers advice, instructions or recommendations. Only your health care provider has the knowledge and training to provide advice that is right for you.  Copyright   Copyright © 2021 Up[employer], Inc. and its affiliates and/or licensors. All rights reserved.    A 4 year old child who has outgrown the forward facing, internal harness system shall be restrained in a belt positioning child booster seat.  If you have an active [employer] account, please look for your well child questionnaire to come to your [facility] account before your next well child visit.   Yes